# Patient Record
Sex: MALE | Race: WHITE | Employment: FULL TIME | URBAN - METROPOLITAN AREA
[De-identification: names, ages, dates, MRNs, and addresses within clinical notes are randomized per-mention and may not be internally consistent; named-entity substitution may affect disease eponyms.]

---

## 2018-08-03 ENCOUNTER — HOSPITAL ENCOUNTER (EMERGENCY)
Facility: CLINIC | Age: 28
Discharge: HOME | End: 2018-08-03
Attending: FAMILY MEDICINE | Admitting: FAMILY MEDICINE
Payer: COMMERCIAL

## 2018-08-03 ENCOUNTER — NURSE TRIAGE (OUTPATIENT)
Dept: NURSING | Facility: CLINIC | Age: 28
End: 2018-08-03

## 2018-08-03 VITALS
HEIGHT: 72 IN | RESPIRATION RATE: 17 BRPM | DIASTOLIC BLOOD PRESSURE: 75 MMHG | WEIGHT: 200 LBS | OXYGEN SATURATION: 98 % | SYSTOLIC BLOOD PRESSURE: 129 MMHG | BODY MASS INDEX: 27.09 KG/M2 | TEMPERATURE: 98.6 F

## 2018-08-03 DIAGNOSIS — T15.92XA FOREIGN BODY, EYE, LEFT, INITIAL ENCOUNTER: ICD-10-CM

## 2018-08-03 DIAGNOSIS — S05.01XA CORNEA ABRASION, RIGHT, INITIAL ENCOUNTER: ICD-10-CM

## 2018-08-03 DIAGNOSIS — T15.91XA FOREIGN BODY, EYE, RIGHT, INITIAL ENCOUNTER: ICD-10-CM

## 2018-08-03 PROCEDURE — 99283 EMERGENCY DEPT VISIT LOW MDM: CPT

## 2018-08-03 PROCEDURE — 99283 EMERGENCY DEPT VISIT LOW MDM: CPT | Mod: Z6 | Performed by: FAMILY MEDICINE

## 2018-08-03 RX ORDER — TETRACAINE HYDROCHLORIDE 5 MG/ML
1-2 SOLUTION OPHTHALMIC ONCE
Status: DISCONTINUED | OUTPATIENT
Start: 2018-08-03 | End: 2018-08-04 | Stop reason: HOSPADM

## 2018-08-03 ASSESSMENT — VISUAL ACUITY
OD: 20/50
OS: 20/30

## 2018-08-03 NOTE — ED AVS SNAPSHOT
[facility] Emergency Department    [address] [address]    Phone:  [phone]    Fax:  [phone]                                       Markus Holman   MRN: [id number]    Department:  [facility] Emergency Department   Date of Visit:  8/3/2018           Patient Information     Date Of Birth          1990        Your diagnoses for this visit were:     Foreign body, eye, left, initial encounter     Foreign body, eye, right, initial encounter     Cornea abrasion, right, initial encounter        You were seen by Man Palma MD.        Discharge Instructions       Return to the Emergency Room if the following occurs:     Worsened vision, purulent drainage, fever >101, or for any concern at anytime.    Or, follow-up with the following provider as we discussed:     Return to the [facility] if you are still symptomatic through the weekend.  See the contact information provided for scheduling.    Medications discussed:    Tetracaine 2 drops every two hours as needed for pain, not to be used beyond 24 hours.  Ibuprofen 600 mg every six hours for pain (7 days duration).  Tylenol 1000 mg every six hours for pain (7 days duration).  Therefore, you can alternate these every three hours and do it safely.    If you received pain-relieving or sedating medication during your time in the ER, avoid alcohol, driving automobiles, or working with machinery.  Also, a responsible adult must stay with you.        Call the Nurse Advice Line at [phone] or [phone] for any concern at anytime.      24 Hour Appointment Hotline       To make an appointment at any [facility], call [phone] ([phone]). If you don't have a family doctor or clinic, we will help you find one. [place] clinics are conveniently located to serve the needs of you and your family.             Review of your medicines      Our records show that you are taking the medicines listed below. If  these are incorrect, please call your family doctor or clinic.        Dose / Directions Last dose taken    * HYDROcodone-acetaminophen 5-500 MG per tablet   Commonly known as:  VICODIN   Dose:  1-2 tablet   Quantity:  20 tablet        Take 1-2 tablets by mouth every 6 hours as needed for pain.   Refills:  0        * HYDROcodone-acetaminophen 5-325 MG per tablet   Commonly known as:  NORCO   Dose:  1-2 tablet   Quantity:  20 tablet        Take 1-2 tablets by mouth every 6 hours as needed for pain.   Refills:  0        ondansetron 4 MG ODT tab   Commonly known as:  ZOFRAN ODT   Dose:  1 tablet   Quantity:  12 tablet        Take 1 tablet by mouth every 4 hours as needed for nausea.   Refills:  0        oxyCODONE-acetaminophen 5-325 MG per tablet   Commonly known as:  PERCOCET   Dose:  1-2 tablet   Quantity:  20 tablet        Take 1-2 tablets by mouth every 6 hours as needed for pain.   Refills:  0        * Notice:  This list has 2 medication(s) that are the same as other medications prescribed for you. Read the directions carefully, and ask your doctor or other care provider to review them with you.            Procedures and tests performed during your visit     Vision checks      Orders Needing Specimen Collection     None      Pending Results     No orders found from 8/1/2018 to 8/4/2018.            Pending Culture Results     No orders found from 8/1/2018 to 8/4/2018.            Pending Results Instructions     If you had any lab results that were not finalized at the time of your Discharge, you can call the ED Lab Result RN at [phone]. You will be contacted by this team for any positive Lab results or changes in treatment. The nurses are available 7 days a week from 10A to 6:30P.  You can leave a message 24 hours per day and they will return your call.        Test Results From Your Hospital Stay               Thank you for choosing [facility]       Thank you for choosing [facility] for your care. Our goal is  "always to provide you with excellent care. Hearing back from our patients is one way we can continue to improve our services. Please take a few minutes to complete the written survey that you may receive in the mail after you visit with us. Thank you!        DropShip Information     [employer] lets you send messages to your doctor, view your test results, renew your prescriptions, schedule appointments and more. To sign up, go to [email or web address] . Click on \"Log in\" on the left side of the screen, which will take you to the Welcome page. Then click on \"Sign up Now\" on the right side of the page.     You will be asked to enter the access code listed below, as well as some personal information. Please follow the directions to create your username and password.     Your access code is: [email or web address]  Expires: 2018  9:46 PM     Your access code will  in 90 days. If you need help or a new code, please call your [place] clinic or [phone].        Care EveryWhere ID     This is your Care EveryWhere ID. This could be used by other organizations to access your [place] medical records  [id number]        Equal Access to Services     HORACIO CRAWLEY : Hadgary Skaggs, wachata lorenzo, qaquintin paniagua, teri shi. So [facility] [phone].    ATENCIÓN: Si habla español, tiene a liao disposición servicios gratuitos de asistencia lingüística. Marie al [phone].    We comply with applicable federal civil rights laws and [place] laws. We do not discriminate on the basis of race, color, national origin, age, disability, sex, sexual orientation, or gender identity.            After Visit Summary       This is your record. Keep this with you and show to your community pharmacist(s) and doctor(s) at your next visit.                  "

## 2018-08-03 NOTE — ED AVS SNAPSHOT
[facility] Emergency Department    [address] [address]    Phone:  [phone]    Fax:  [phone]                                       Markus Holman   MRN: [id number]    Department:  [facility] Emergency Department   Date of Visit:  8/3/2018           After Visit Summary Signature Page     I have received my discharge instructions, and my questions have been answered. I have discussed any challenges I see with this plan with the nurse or doctor.    ..........................................................................................................................................  Patient/Patient Representative Signature      ..........................................................................................................................................  Patient Representative Print Name and Relationship to Patient    ..................................................               ................................................  Date                                            Time    ..........................................................................................................................................  Reviewed by Signature/Title    ...................................................              ..............................................  Date                                                            Time

## 2018-08-04 NOTE — DISCHARGE INSTRUCTIONS
Return to the Emergency Room if the following occurs:     Worsened vision, purulent drainage, fever >101, or for any concern at anytime.    Or, follow-up with the following provider as we discussed:     Return to [facility] if you are still symptomatic through the weekend.  See the contact information provided for scheduling.    Medications discussed:    Tetracaine 2 drops every two hours as needed for pain, not to be used beyond 24 hours.  Ibuprofen 600 mg every six hours for pain (7 days duration).  Tylenol 1000 mg every six hours for pain (7 days duration).  Therefore, you can alternate these every three hours and do it safely.    If you received pain-relieving or sedating medication during your time in the ER, avoid alcohol, driving automobiles, or working with machinery.  Also, a responsible adult must stay with you.        Call the Nurse Advice Line at [phone] or [phone] for any concern at anytime.

## 2018-08-04 NOTE — ED PROVIDER NOTES
"HPI  Patient is a 28-year-old male presenting with concern for foreign body in his left eye and possibly the right as well.  Yesterday he was under an old car when there was a \"big puff of great\" that fell onto his face.  He recognized foreign body that was obvious in the left eye.  This continued to be a problem over the course of yesterday and then into today.  He did not recognize an obvious foreign body in the right eye but he has had difficulty with a foreign body sensation or irritation in the right eye today as well.  Tearing has been occurring intermittently throughout the day.  He has not been able to see anything in the eye that would suggest an ongoing foreign body.  He denies obvious change in vision.  No other injury reported.    ROS: All other review of systems are negative other than that noted above.      No past medical history on file.  No past surgical history on file.  No family history on file.  Social History   Substance Use Topics     Smoking status: Current Every Day Smoker     Types: Cigarettes     Smokeless tobacco: Never Used     Alcohol use No         PHYSICAL  /75  Temp 98.6  F (37  C) (Oral)  Resp 17  Ht 1.829 m (6')  Wt 90.7 kg (200 lb)  SpO2 98%  BMI 27.12 kg/m2  General: Patient is alert and in moderate distress.  Neurological: Alert.  Moving upper and lower extremities equally, bilaterally.  Head / Neck: Atraumatic.  Ears: Not done.  Eyes: Pupils are equal, round, and reactive.  Conjunctival injection on the left is full.  Conjunctival injection on the right is only along the medial aspect.  No foreign body identified.  Anterior chambers unremarkable.  Top lids were flipped and no foreign body identified.  Dye was placed and there is very minimal uptake on the right side along the lateral limbus.  There is no uptake on the left.  Nose: Midline.  No epistaxis.  Mouth / Throat:  Moist. Respiratory: No respiratory distress.  Cardiovascular: Regular rhythm.  Peripheral " extremities are warm.  Abdomen / Pelvis: Not done.  Genitalia: Not done.  Musculoskeletal: Not done.  Skin: No evidence of rash or trauma.        PHYSICIAN  2123.  Patient has the sensation of foreign body in both eyes, left greater than right.  I will apply tetracaine and then perform an examination with the slit lamp, etc.  These will be documented above.    2143.  The patient has a very small corneal abrasion on the right.  No foreign bodies identified.  I will irrigate both eyes with 500 mL before discharge.  [facility] with [facility] if not improved through the weekend.  Return here for worsening as discussed.  No antibiotics at this time.      IMPRESSION    ICD-10-CM    1. Foreign body, eye, left, initial encounter T15.92XA    2. Foreign body, eye, right, initial encounter T15.91XA    3. Cornea abrasion, right, initial encounter S05.01XA               Man Palma MD  08/03/18 2142

## 2018-08-04 NOTE — ED NOTES
Pt states that he was working on car yesterday and dust or rust dropped into both eyes.  Pt states that he waited to come in until today because he noticed that the irritation (burning and itching) was not improving.  Pt states he sees halos when looking at lights.  Left eye feels worse than right, and possibly blurry.